# Patient Record
Sex: FEMALE | Race: WHITE | NOT HISPANIC OR LATINO
[De-identification: names, ages, dates, MRNs, and addresses within clinical notes are randomized per-mention and may not be internally consistent; named-entity substitution may affect disease eponyms.]

---

## 2021-04-06 ENCOUNTER — APPOINTMENT (OUTPATIENT)
Dept: OPHTHALMOLOGY | Facility: CLINIC | Age: 76
End: 2021-04-06
Payer: MEDICARE

## 2021-04-06 ENCOUNTER — NON-APPOINTMENT (OUTPATIENT)
Age: 76
End: 2021-04-06

## 2021-04-06 PROCEDURE — 92012 INTRM OPH EXAM EST PATIENT: CPT

## 2021-04-06 PROCEDURE — 92136 OPHTHALMIC BIOMETRY: CPT

## 2021-04-23 ENCOUNTER — APPOINTMENT (OUTPATIENT)
Dept: DISASTER EMERGENCY | Facility: CLINIC | Age: 76
End: 2021-04-23

## 2021-04-23 DIAGNOSIS — Z01.818 ENCOUNTER FOR OTHER PREPROCEDURAL EXAMINATION: ICD-10-CM

## 2021-04-23 PROBLEM — Z00.00 ENCOUNTER FOR PREVENTIVE HEALTH EXAMINATION: Status: ACTIVE | Noted: 2021-04-23

## 2021-04-24 LAB — SARS-COV-2 N GENE NPH QL NAA+PROBE: NOT DETECTED

## 2021-04-25 ENCOUNTER — TRANSCRIPTION ENCOUNTER (OUTPATIENT)
Age: 76
End: 2021-04-25

## 2021-04-26 ENCOUNTER — NON-APPOINTMENT (OUTPATIENT)
Age: 76
End: 2021-04-26

## 2021-04-26 ENCOUNTER — APPOINTMENT (OUTPATIENT)
Dept: OPHTHALMOLOGY | Facility: AMBULATORY SURGERY CENTER | Age: 76
End: 2021-04-26

## 2021-04-26 ENCOUNTER — OUTPATIENT (OUTPATIENT)
Dept: OUTPATIENT SERVICES | Facility: HOSPITAL | Age: 76
LOS: 1 days | Discharge: HOME | End: 2021-04-26
Payer: MEDICARE

## 2021-04-26 PROCEDURE — 65710 CORNEAL TRANSPLANT: CPT | Mod: RT

## 2021-04-27 ENCOUNTER — NON-APPOINTMENT (OUTPATIENT)
Age: 76
End: 2021-04-27

## 2021-04-27 ENCOUNTER — APPOINTMENT (OUTPATIENT)
Dept: OPHTHALMOLOGY | Facility: CLINIC | Age: 76
End: 2021-04-27
Payer: MEDICARE

## 2021-04-27 PROCEDURE — 99024 POSTOP FOLLOW-UP VISIT: CPT

## 2021-05-04 ENCOUNTER — APPOINTMENT (OUTPATIENT)
Dept: OPHTHALMOLOGY | Facility: CLINIC | Age: 76
End: 2021-05-04
Payer: MEDICARE

## 2021-05-04 ENCOUNTER — NON-APPOINTMENT (OUTPATIENT)
Age: 76
End: 2021-05-04

## 2021-05-04 PROCEDURE — 99024 POSTOP FOLLOW-UP VISIT: CPT

## 2021-05-25 ENCOUNTER — APPOINTMENT (OUTPATIENT)
Dept: OPHTHALMOLOGY | Facility: CLINIC | Age: 76
End: 2021-05-25
Payer: MEDICARE

## 2021-05-25 ENCOUNTER — NON-APPOINTMENT (OUTPATIENT)
Age: 76
End: 2021-05-25

## 2021-05-25 PROCEDURE — 99024 POSTOP FOLLOW-UP VISIT: CPT

## 2021-06-15 ENCOUNTER — APPOINTMENT (OUTPATIENT)
Dept: OPHTHALMOLOGY | Facility: CLINIC | Age: 76
End: 2021-06-15
Payer: MEDICARE

## 2021-06-15 ENCOUNTER — NON-APPOINTMENT (OUTPATIENT)
Age: 76
End: 2021-06-15

## 2021-06-15 PROCEDURE — 92025 CPTRIZED CORNEAL TOPOGRAPHY: CPT

## 2021-06-15 PROCEDURE — 99024 POSTOP FOLLOW-UP VISIT: CPT

## 2021-08-01 ENCOUNTER — TRANSCRIPTION ENCOUNTER (OUTPATIENT)
Age: 76
End: 2021-08-01

## 2021-08-02 ENCOUNTER — APPOINTMENT (OUTPATIENT)
Dept: OPHTHALMOLOGY | Facility: AMBULATORY SURGERY CENTER | Age: 76
End: 2021-08-02

## 2021-08-02 ENCOUNTER — NON-APPOINTMENT (OUTPATIENT)
Age: 76
End: 2021-08-02

## 2021-08-02 ENCOUNTER — OUTPATIENT (OUTPATIENT)
Dept: OUTPATIENT SERVICES | Facility: HOSPITAL | Age: 76
LOS: 1 days | Discharge: HOME | End: 2021-08-02
Payer: MEDICARE

## 2021-08-02 ENCOUNTER — RESULT REVIEW (OUTPATIENT)
Age: 76
End: 2021-08-02

## 2021-08-02 PROCEDURE — 65400 REMOVAL OF EYE LESION: CPT | Mod: LT

## 2021-08-02 PROCEDURE — 88304 TISSUE EXAM BY PATHOLOGIST: CPT | Mod: 26

## 2021-08-03 ENCOUNTER — APPOINTMENT (OUTPATIENT)
Dept: OPHTHALMOLOGY | Facility: CLINIC | Age: 76
End: 2021-08-03
Payer: MEDICARE

## 2021-08-03 ENCOUNTER — NON-APPOINTMENT (OUTPATIENT)
Age: 76
End: 2021-08-03

## 2021-08-03 PROCEDURE — 99024 POSTOP FOLLOW-UP VISIT: CPT

## 2021-08-04 LAB — SURGICAL PATHOLOGY STUDY: SIGNIFICANT CHANGE UP

## 2021-08-10 ENCOUNTER — APPOINTMENT (OUTPATIENT)
Dept: OPHTHALMOLOGY | Facility: CLINIC | Age: 76
End: 2021-08-10
Payer: MEDICARE

## 2021-08-10 ENCOUNTER — NON-APPOINTMENT (OUTPATIENT)
Age: 76
End: 2021-08-10

## 2021-08-10 PROCEDURE — 99024 POSTOP FOLLOW-UP VISIT: CPT

## 2021-08-31 ENCOUNTER — NON-APPOINTMENT (OUTPATIENT)
Age: 76
End: 2021-08-31

## 2021-08-31 ENCOUNTER — APPOINTMENT (OUTPATIENT)
Dept: OPHTHALMOLOGY | Facility: CLINIC | Age: 76
End: 2021-08-31
Payer: MEDICARE

## 2021-08-31 PROCEDURE — 99024 POSTOP FOLLOW-UP VISIT: CPT

## 2023-05-18 ENCOUNTER — NON-APPOINTMENT (OUTPATIENT)
Age: 78
End: 2023-05-18

## 2023-06-11 ENCOUNTER — NON-APPOINTMENT (OUTPATIENT)
Age: 78
End: 2023-06-11

## 2024-02-27 ENCOUNTER — APPOINTMENT (OUTPATIENT)
Dept: OPHTHALMOLOGY | Facility: CLINIC | Age: 79
End: 2024-02-27

## 2024-02-27 ENCOUNTER — OUTPATIENT (OUTPATIENT)
Dept: OUTPATIENT SERVICES | Facility: HOSPITAL | Age: 79
LOS: 1 days | End: 2024-02-27

## 2024-03-01 DIAGNOSIS — H18.609 KERATOCONUS, UNSPECIFIED, UNSPECIFIED EYE: ICD-10-CM

## 2024-03-01 DIAGNOSIS — H26.9 UNSPECIFIED CATARACT: ICD-10-CM

## 2024-03-06 NOTE — ASU PATIENT PROFILE, ADULT - NSICDXPASTSURGICALHX_GEN_ALL_CORE_FT
PAST SURGICAL HISTORY:  H/O breast biopsy     H/O colonoscopy     H/O cosmetic surgery     H/O dilation and curettage     H/O eye surgery

## 2024-03-06 NOTE — ASU PATIENT PROFILE, ADULT - FALL HARM RISK - UNIVERSAL INTERVENTIONS
Bed in lowest position, wheels locked, appropriate side rails in place/Call bell, personal items and telephone in reach/Instruct patient to call for assistance before getting out of bed or chair/Non-slip footwear when patient is out of bed/[place] to call system/Physically safe environment - no spills, clutter or unnecessary equipment/Purposeful Proactive Rounding/Room/bathroom lighting operational, light cord in reach

## 2024-03-07 ENCOUNTER — OUTPATIENT (OUTPATIENT)
Dept: OUTPATIENT SERVICES | Facility: HOSPITAL | Age: 79
LOS: 1 days | Discharge: HOME | End: 2024-03-07

## 2024-03-07 ENCOUNTER — TRANSCRIPTION ENCOUNTER (OUTPATIENT)
Age: 79
End: 2024-03-07

## 2024-03-07 VITALS
HEART RATE: 68 BPM | HEIGHT: 63 IN | OXYGEN SATURATION: 98 % | DIASTOLIC BLOOD PRESSURE: 85 MMHG | RESPIRATION RATE: 16 BRPM | WEIGHT: 134.04 LBS | SYSTOLIC BLOOD PRESSURE: 123 MMHG | TEMPERATURE: 98 F

## 2024-03-07 VITALS
TEMPERATURE: 98 F | DIASTOLIC BLOOD PRESSURE: 64 MMHG | OXYGEN SATURATION: 96 % | SYSTOLIC BLOOD PRESSURE: 120 MMHG | HEART RATE: 61 BPM | RESPIRATION RATE: 14 BRPM

## 2024-03-07 DIAGNOSIS — Z98.890 OTHER SPECIFIED POSTPROCEDURAL STATES: Chronic | ICD-10-CM

## 2024-03-07 LAB — GLUCOSE BLDC GLUCOMTR-MCNC: 104 MG/DL — HIGH (ref 70–99)

## 2024-03-07 DEVICE — LENS IOL TECNIS PROTEC ZCB00 20.5D
Type: IMPLANTABLE DEVICE | Status: NON-FUNCTIONAL
Removed: 2024-03-07

## 2024-03-07 RX ORDER — PHENYLEPHRINE HCL 2.5 %
1 DROPS OPHTHALMIC (EYE)
Refills: 0 | Status: COMPLETED | OUTPATIENT
Start: 2024-03-07 | End: 2024-03-07

## 2024-03-07 RX ORDER — TROPICAMIDE 1 %
1 DROPS OPHTHALMIC (EYE)
Refills: 0 | Status: COMPLETED | OUTPATIENT
Start: 2024-03-07 | End: 2024-03-07

## 2024-03-07 RX ORDER — CYCLOPENTOLATE HYDROCHLORIDE 10 MG/ML
1 SOLUTION/ DROPS OPHTHALMIC
Refills: 0 | Status: DISCONTINUED | OUTPATIENT
Start: 2024-03-07 | End: 2024-03-07

## 2024-03-07 RX ORDER — KETOROLAC TROMETHAMINE 0.5 %
1 DROPS OPHTHALMIC (EYE)
Refills: 0 | Status: COMPLETED | OUTPATIENT
Start: 2024-03-07 | End: 2024-03-07

## 2024-03-07 RX ORDER — OFLOXACIN 0.3 %
1 DROPS OPHTHALMIC (EYE)
Refills: 0 | Status: COMPLETED | OUTPATIENT
Start: 2024-03-07 | End: 2024-03-07

## 2024-03-07 RX ADMIN — Medication 1 DROP(S): at 11:20

## 2024-03-07 RX ADMIN — Medication 1 DROP(S): at 11:10

## 2024-03-07 RX ADMIN — Medication 1 DROP(S): at 11:15

## 2024-03-07 NOTE — ASU PREOP CHECKLIST - ISOLATION PRECAUTIONS
Sildenafil 100 mg prn ED  Refill 1/24/2019 (not by PCP)  LOV 3/2022    No labs needed, no PDMP sent to MD   none

## 2024-03-07 NOTE — ASU DISCHARGE PLAN (ADULT/PEDIATRIC) - NS MD DC FALL RISK RISK
For information on Fall & Injury Prevention, visit: [email or web address] OR  [email or web address] OR  https://www.cdc.gov/steadi/patient.html

## 2024-03-07 NOTE — PRE-ANESTHESIA EVALUATION ADULT - NSANTHALCOHOLSD_GEN_ALL_CORE
"Requested Prescriptions   Pending Prescriptions Disp Refills     pioglitazone (ACTOS) 30 MG tablet 90 tablet 0     Sig: Take 1 tablet (30 mg) by mouth daily       Thiazolidinedione Agents (TZDs)  Passed - 5/2/2022  2:46 PM        Passed - Patient has a normal ALT within the past 12 mos.     Recent Labs   Lab Test 01/26/22  1159   ALT 25             Passed - Patient has a normal AST within the past 12 mos.      Recent Labs   Lab Test 01/26/22  1159   AST 22             Passed - Patient has documented A1c within the specified period of time.     If HgbA1C is 8 or greater, it needs to be on file within the past 3 months.  If less than 8, must be on file within the past 6 months.     Recent Labs   Lab Test 11/09/21  1125   A1C 5.8*             Passed - Diagnosis not CHF        Passed - Medication is active on med list        Passed - Patient is age 18 or older        Passed - Patient has a normal serum Creatinine in the past 12 months     Recent Labs   Lab Test 01/26/22  1159   CR 0.93       Ok to refill medication if creatinine is low          Passed - Recent (6 mo) or future (30 days) visit within the authorizing provider's specialty     Patient had office visit in the last 6 months or has a visit in the next 30 days with authorizing provider or within the authorizing provider's specialty.  See \"Patient Info\" tab in inbasket, or \"Choose Columns\" in Meds & Orders section of the refill encounter.                 Prescription approved per [facility] Refill Protocol.    Sister said they do not have any refill of carvedilol, but should have 1 90 day remaining. Called pharmacy and they have refills remaining.     Poornima Wiseman RN    "
Yes

## 2024-03-07 NOTE — PRE-ANESTHESIA EVALUATION ADULT - NSANTHOSAYNRD_GEN_A_CORE
No. NONI screening performed.  STOP BANG Legend: 0-2 = LOW Risk; 3-4 = INTERMEDIATE Risk; 5-8 = HIGH Risk

## 2024-03-08 NOTE — OPERATIVE REPORT - OPERATIVE RPOSRT DETAILS
PROCEDURE DATE: 03-07-24    OPERATION:  Phacoemulsification with lens implant, right eye, plus femtolaser plus ORA, right eye.    PREOPERATIVE DIAGNOSES:  Nuclear sclerotic cataract- RIGHT eye    POSTOPERATIVE DIAGNOSES:  Nuclear sclerotic cataract - RIGHT eye    DESCRIPTION OF PROCEDURE:  After appropriate medical clearance and informed consent was obtained, the patient was brought into the operating room in stable condition.  The patient was in the prone position for femtolaser to the right eye.  Section cup placed on the right eye and docked to the laser.  Capsulorrhexis and nuclear fragmentation was performed.  Section was removed and femto procedure was finished.  After this, the patient was brought into the operating room where MAC anesthesia was induced and the patient was prepped and draped in the usual manner for sterile ophthalmic surgery.  A Angel speculum was placed into the eye and stabilized with two 4x4s.  Topical lidocaine 4% and Ocucoat viscoelastic was instilled over the cornea.  The surgeon sat temporally.  A paracentesis was made and intraocular lidocaine 1% was instilled into the anterior chamber.  Healon GV was then placed into the anterior chamber.    An anterior rhexis capsulotomy was then performed without complication.  Hydrodissection and hydrodelineation of the lens was then performed using BSS on a flat-tip cannula.  Phacoemulsification of the nucleus was then performed by sculpting the nucleus in half.  Healon GV was then placed into the bag and the cracker was used to split the nucleus in half.  Viscoat was then placed into the crack to enlarge the crack and to shield the endothelium.  Phacoemulsification of the 2 halves was then performed without complication.    Mechanical irrigation and aspiration was then performed to remove any remaining cortical material.  Polishing of the anterior and posterior capsules was then performed.  The bag was then inflated using Healon to separate the anterior and posterior leaflets.  A Barraquer tonometer was then used to determine that intraocular pressure of the eye was somewhere between 15 to 20 mmHg.  The ORA machine was then used to determine the correct intraocular lens implant, which was confirmed at 20.5 diopters for an ZCBOO lens.  The ORA machine was turned off and the Implant number [id number], 20.5 diopter was inserted into the bag and rotated into position.  Irrigation and aspiration of the remaining viscoelastic material was then performed with gentle tapping of the lens to ensure that no viscoelastic material was caught behind the lens.  Miochol was inserted into the eye after inserting into the eye and a round pupil was noted at the end of the case.    Careful attention to ensure that there was no wound leak was performed.  A speculum was removed.  A drop each of TobraDex and Timoptic XE was placed into the eye.  A clear shield was then taped over the eye.  The patient returned to the recovery room in stable and improved condition.

## 2024-03-13 PROBLEM — M19.90 UNSPECIFIED OSTEOARTHRITIS, UNSPECIFIED SITE: Chronic | Status: ACTIVE | Noted: 2024-03-07

## 2024-03-13 PROBLEM — E78.00 PURE HYPERCHOLESTEROLEMIA, UNSPECIFIED: Chronic | Status: ACTIVE | Noted: 2024-03-07

## 2024-03-13 PROBLEM — E03.9 HYPOTHYROIDISM, UNSPECIFIED: Chronic | Status: ACTIVE | Noted: 2024-03-07

## 2024-03-19 RX ORDER — SODIUM CHLORIDE 9 MG/ML
1000 INJECTION, SOLUTION INTRAVENOUS
Refills: 0 | Status: DISCONTINUED | OUTPATIENT
Start: 2024-03-21 | End: 2024-03-21

## 2024-03-20 NOTE — ASU PATIENT PROFILE, ADULT - ABLE TO REACH PT
Voice message said having issues with my cell phone if you can text me your message please, Information was texted to patient regarding arrival and Or time and also instructions, prior to texting patient left message at Dr. Mei' number that I wasn't able to reach patient, OR nurse manager, Shanta was also informed if they can call patient in the morning./no

## 2024-03-20 NOTE — ASU PATIENT PROFILE, ADULT - REASON FOR ADMISSION, PROFILE
-- DO NOT REPLY / DO NOT REPLY ALL --  -- Message is from the [facility]--    General Patient Message      Reason for Call: due to change in insurance, Insulin Pen Needle (B-D U/F PEN NEEDLE) 31G X 5 MM Misc is no longer covered. Pt is unaware of drug insurance, please send alternative or assist with prior authorization.     Caller Information       Type Contact Phone    06/06/2022 02:15 PM CDT Phone (Incoming) Torsten Parry Jr (Self) [phone] ()          Alternative phone number: n/a    Turnaround time given to caller:   \"This message will be sent to [state Provider's name]. The clinical team will fulfill your request as soon as they review your message.\"     eye surgery

## 2024-03-21 ENCOUNTER — TRANSCRIPTION ENCOUNTER (OUTPATIENT)
Age: 79
End: 2024-03-21

## 2024-03-21 ENCOUNTER — OUTPATIENT (OUTPATIENT)
Dept: OUTPATIENT SERVICES | Facility: HOSPITAL | Age: 79
LOS: 1 days | Discharge: HOME | End: 2024-03-21

## 2024-03-21 VITALS
HEART RATE: 58 BPM | SYSTOLIC BLOOD PRESSURE: 126 MMHG | OXYGEN SATURATION: 99 % | TEMPERATURE: 98 F | WEIGHT: 134.48 LBS | RESPIRATION RATE: 14 BRPM | DIASTOLIC BLOOD PRESSURE: 76 MMHG | HEIGHT: 63 IN

## 2024-03-21 VITALS
TEMPERATURE: 97 F | OXYGEN SATURATION: 99 % | RESPIRATION RATE: 16 BRPM | SYSTOLIC BLOOD PRESSURE: 120 MMHG | DIASTOLIC BLOOD PRESSURE: 61 MMHG | HEART RATE: 64 BPM

## 2024-03-21 DIAGNOSIS — Z98.890 OTHER SPECIFIED POSTPROCEDURAL STATES: Chronic | ICD-10-CM

## 2024-03-21 DEVICE — LENS IOL TECNIS PROTEC ZCB00 21.0D
Type: IMPLANTABLE DEVICE | Site: LEFT | Status: NON-FUNCTIONAL
Removed: 2024-03-21

## 2024-03-21 RX ORDER — OFLOXACIN 0.3 %
1 DROPS OPHTHALMIC (EYE)
Refills: 0 | Status: COMPLETED | OUTPATIENT
Start: 2024-03-21 | End: 2024-03-21

## 2024-03-21 RX ORDER — METFORMIN HYDROCHLORIDE 850 MG/1
1 TABLET ORAL
Refills: 0 | DISCHARGE

## 2024-03-21 RX ORDER — PHENYLEPHRINE HCL 2.5 %
1 DROPS OPHTHALMIC (EYE)
Refills: 0 | Status: COMPLETED | OUTPATIENT
Start: 2024-03-21 | End: 2024-03-21

## 2024-03-21 RX ORDER — KETOROLAC TROMETHAMINE 0.5 %
1 DROPS OPHTHALMIC (EYE)
Refills: 0 | Status: COMPLETED | OUTPATIENT
Start: 2024-03-21 | End: 2024-03-21

## 2024-03-21 RX ORDER — TROPICAMIDE 1 %
1 DROPS OPHTHALMIC (EYE)
Refills: 0 | Status: COMPLETED | OUTPATIENT
Start: 2024-03-21 | End: 2024-03-21

## 2024-03-21 RX ORDER — ATORVASTATIN CALCIUM 80 MG/1
1 TABLET, FILM COATED ORAL
Refills: 0 | DISCHARGE

## 2024-03-21 RX ORDER — LEVOTHYROXINE SODIUM 125 MCG
1 TABLET ORAL
Refills: 0 | DISCHARGE

## 2024-03-21 RX ORDER — ACETAMINOPHEN 500 MG
650 TABLET ORAL ONCE
Refills: 0 | Status: DISCONTINUED | OUTPATIENT
Start: 2024-03-21 | End: 2024-03-21

## 2024-03-21 RX ORDER — SODIUM CHLORIDE 9 MG/ML
1000 INJECTION, SOLUTION INTRAVENOUS
Refills: 0 | Status: DISCONTINUED | OUTPATIENT
Start: 2024-03-21 | End: 2024-03-21

## 2024-03-21 RX ORDER — ONDANSETRON 8 MG/1
4 TABLET, FILM COATED ORAL ONCE
Refills: 0 | Status: DISCONTINUED | OUTPATIENT
Start: 2024-03-21 | End: 2024-03-21

## 2024-03-21 RX ORDER — FENTANYL CITRATE 50 UG/ML
25 INJECTION INTRAVENOUS
Refills: 0 | Status: DISCONTINUED | OUTPATIENT
Start: 2024-03-21 | End: 2024-03-21

## 2024-03-21 RX ORDER — CYCLOPENTOLATE HYDROCHLORIDE 10 MG/ML
1 SOLUTION/ DROPS OPHTHALMIC
Refills: 0 | Status: DISCONTINUED | OUTPATIENT
Start: 2024-03-21 | End: 2024-03-21

## 2024-03-21 RX ADMIN — Medication 1 DROP(S): at 10:35

## 2024-03-21 RX ADMIN — Medication 1 DROP(S): at 10:40

## 2024-03-21 RX ADMIN — Medication 1 DROP(S): at 10:45

## 2024-03-21 NOTE — PRE-ANESTHESIA EVALUATION ADULT - NSANTHOSAYNRD_GEN_A_CORE
No. NONI screening performed.  STOP BANG Legend: 0-2 = LOW Risk; 3-4 = INTERMEDIATE Risk; 5-8 = HIGH Risk
No. NONI screening performed.  STOP BANG Legend: 0-2 = LOW Risk; 3-4 = INTERMEDIATE Risk; 5-8 = HIGH Risk

## 2024-03-21 NOTE — PRE-ANESTHESIA EVALUATION ADULT - NSANTHTOTALSCORECAL_ENT_A_CORE
Aleida Owens LMSW  [facility]   [id number]    
1
1

## 2024-03-21 NOTE — ASU DISCHARGE PLAN (ADULT/PEDIATRIC) - NS MD DC FALL RISK RISK
For information on Fall & Injury Prevention, visit: [email or web address] OR  [email or web address] OR  https://www.cdc.gov/steadi/patient.html

## 2024-03-22 NOTE — OPERATIVE REPORT - OPERATIVE RPOSRT DETAILS
PROCEDURE DATE: 03-21-24    OPERATION:  Phacoemulsification with lens implant, LEFT eye, plus femtolaser plus ORA [place].    PREOPERATIVE DIAGNOSES:  Nuclear sclerotic cataract- LEFT eye    POSTOPERATIVE DIAGNOSES:  Nuclear sclerotic cataract - LEFT eye    DESCRIPTION OF PROCEDURE:  After appropriate medical clearance and informed consent was obtained, the patient was brought into the operating room in stable condition.  The patient was in the prone position for femtolaser to the left eye.  Section cup placed on the left eye and docked to the laser.  Capsulorrhexis and nuclear fragmentation was performed.  Section was removed and femto procedure was finished.  After this, the patient was brought into the operating room where MAC anesthesia was induced and the patient was prepped and draped in the usual manner for sterile ophthalmic surgery.  A Angel speculum was placed into the eye and stabilized with two 4x4s.  Topical lidocaine 4% and Ocucoat viscoelastic was instilled over the cornea.  The surgeon sat temporally.  A paracentesis was made and intraocular lidocaine 1% was instilled into the anterior chamber.  Healon GV was then placed into the anterior chamber.    An anterior rhexis capsulotomy was then performed without complication.  Hydrodissection and hydrodelineation of the lens was then performed using BSS on a flat-tip cannula.  Phacoemulsification of the nucleus was then performed by sculpting the nucleus in half.  Healon GV was then placed into the bag and the cracker was used to split the nucleus in half.  Viscoat was then placed into the crack to enlarge the crack and to shield the endothelium.  Phacoemulsification of the 2 halves was then performed without complication.    Mechanical irrigation and aspiration was then performed to remove any remaining cortical material.  Polishing of the anterior and posterior capsules was then performed.  The bag was then inflated using Healon to separate the anterior and posterior leaflets.  A Barraquer tonometer was then used to determine that intraocular pressure of the eye was somewhere between 15 to 20 mmHg.  The ORA machine was then used to determine the correct intraocular lens implant, which was confirmed at 21.0 diopters for an ZCBOO lens.  The ORA machine was turned off and the Implant number [id number], 21.0 diopter was inserted into the bag and rotated into position.  Irrigation and aspiration of the remaining viscoelastic material was then performed with gentle tapping of the lens to ensure that no viscoelastic material was caught behind the lens.  Miochol was inserted into the eye after inserting into the eye and a round pupil was noted at the end of the case.    Careful attention to ensure that there was no wound leak was performed.  A speculum was removed.  A drop each of TobraDex and Timoptic XE was placed into the eye.  A clear shield was then taped over the eye.  The patient returned to the recovery room in stable and improved condition.

## (undated) DEVICE — KNIFE ALCON SLIT INTREPID CLEAR-CUT SAFETY 2.4MM

## (undated) DEVICE — PACK CENTURION 2.4MM

## (undated) DEVICE — NUCLEUS HYDRODISSECTOR PEARCE ANGLED 25G X 22MM

## (undated) DEVICE — PACK CENTURION FMS ACT.9 30 BAL

## (undated) DEVICE — SYR LUER LOK 1CC

## (undated) DEVICE — SOL IRR BAL SALT 500ML

## (undated) DEVICE — MARKING PEN W RULER

## (undated) DEVICE — Device

## (undated) DEVICE — KNIFE ALCON STANDARD FULL HANDLE 15 DEG (PINK)

## (undated) DEVICE — APPLICATOR COTTON TIP 6"

## (undated) DEVICE — DRAPE MICROSCOPE KNOB COVER SMALL (2 PCS)

## (undated) DEVICE — GLV 6.5 PROTEXIS (WHITE)

## (undated) DEVICE — TRANSFORMER INTREPID I/A 0.3MM